# Patient Record
Sex: FEMALE | Race: WHITE | NOT HISPANIC OR LATINO | URBAN - METROPOLITAN AREA
[De-identification: names, ages, dates, MRNs, and addresses within clinical notes are randomized per-mention and may not be internally consistent; named-entity substitution may affect disease eponyms.]

---

## 2023-10-02 ENCOUNTER — OFFICE VISIT (OUTPATIENT)
Dept: OPHTHALMOLOGY | Facility: MEDICAL CENTER | Age: 53
End: 2023-10-02
Payer: OTHER MISCELLANEOUS

## 2023-10-02 DIAGNOSIS — H52.13 MYOPIA OF BOTH EYES: ICD-10-CM

## 2023-10-02 DIAGNOSIS — H47.321 DRUSEN OF RIGHT OPTIC DISC: ICD-10-CM

## 2023-10-02 PROCEDURE — 99204 OFFICE O/P NEW MOD 45 MIN: CPT | Mod: 25 | Performed by: OPHTHALMOLOGY

## 2023-10-02 PROCEDURE — 92250 FUNDUS PHOTOGRAPHY W/I&R: CPT | Performed by: OPHTHALMOLOGY

## 2023-10-02 PROCEDURE — 92083 EXTENDED VISUAL FIELD XM: CPT | Performed by: OPHTHALMOLOGY

## 2023-10-02 RX ORDER — LEVOTHYROXINE SODIUM 0.12 MG/1
TABLET ORAL
COMMUNITY
Start: 2023-07-28

## 2023-10-02 RX ORDER — CLOBETASOL PROPIONATE 0.5 MG/G
OINTMENT TOPICAL
COMMUNITY
Start: 2023-07-28

## 2023-10-02 RX ORDER — POTASSIUM CHLORIDE 750 MG/1
10 TABLET, FILM COATED, EXTENDED RELEASE ORAL
COMMUNITY
Start: 2023-07-28

## 2023-10-02 RX ORDER — LIOTHYRONINE SODIUM 5 UG/1
TABLET ORAL
COMMUNITY

## 2023-10-02 RX ORDER — FUROSEMIDE 20 MG/1
TABLET ORAL
COMMUNITY
Start: 2023-09-07

## 2023-10-02 ASSESSMENT — TONOMETRY
OD_IOP_MMHG: 19
OS_IOP_MMHG: 19
OD_IOP_MMHG: 19
IOP_METHOD: APPLANATION
OS_IOP_MMHG: 18
IOP_METHOD: I-CARE

## 2023-10-02 ASSESSMENT — REFRACTION_WEARINGRX
OS_CYLINDER: +0.50
OS_AXIS: 077
SPECS_TYPE: SVL
OS_SPHERE: -4.25
OD_SPHERE: -3.75
OD_CYLINDER: SPHERE

## 2023-10-02 ASSESSMENT — VISUAL ACUITY
OD_CC: 20/20
OS_CC: 20/20
OD_CC+: -1
CORRECTION_TYPE: CONTACTS
OD_CC: J1+
METHOD: SNELLEN - LINEAR
OS_CC: J1+

## 2023-10-02 ASSESSMENT — REFRACTION_MANIFEST
OD_SPHERE: -3.75
OS_CYLINDER: +0.75
OD_AXIS: 067
OD_CYLINDER: +0.25
OS_SPHERE: -3.75
METHOD_AUTOREFRACTION: 1
OS_AXIS: 096

## 2023-10-02 ASSESSMENT — CONF VISUAL FIELD
OD_INFERIOR_NASAL_RESTRICTION: 0
OD_NORMAL: 1
OD_INFERIOR_TEMPORAL_RESTRICTION: 0
OD_SUPERIOR_NASAL_RESTRICTION: 0
OS_INFERIOR_TEMPORAL_RESTRICTION: 0
OS_INFERIOR_NASAL_RESTRICTION: 0
OS_SUPERIOR_TEMPORAL_RESTRICTION: 0
OD_SUPERIOR_TEMPORAL_RESTRICTION: 0
OS_NORMAL: 1
OS_SUPERIOR_NASAL_RESTRICTION: 0

## 2023-10-02 ASSESSMENT — EXTERNAL EXAM - RIGHT EYE: OD_EXAM: NORMAL

## 2023-10-02 ASSESSMENT — SLIT LAMP EXAM - LIDS
COMMENTS: NORMAL
COMMENTS: NORMAL

## 2023-10-02 ASSESSMENT — EXTERNAL EXAM - LEFT EYE: OS_EXAM: NORMAL

## 2023-10-02 NOTE — PROGRESS NOTES
Peds/Neuro Ophthalmology:   Semaj Rae M.D.    Date & Time note created:    10/2/2023   4:23 PM     Referring MD / APRN:  MARY Freedman, No att. providers found    Patient ID:  Name:             Claire Ralph     YOB: 1970  Age:                 53 y.o.  female   MRN:               [id number]    Chief Complaint/Reason for Visit:     Optic Disc Drusen (New patient evaluation for both eyes)      History of Present Illness:    Claire Ralph is a 53 y.o. 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 Pt states vision is stable with glasses and contacts. Pt has been followed by Dr. Shelley in [place]. Pt has not seen an changes to vision. Does have allergies and it can make eyes water and itchy sometimes. No headaches.         Review of Systems:  Review of Systems   Eyes:         Optic disc Drusen OU   All other systems reviewed and are negative.      Past Medical History:   Past Medical History:   Diagnosis Date    Arthritis     Thyroid disease        Past Surgical History:  Past Surgical History:   Procedure Laterality Date    KNEE ARTHROSCOPY Right 03/01/2021       Current Outpatient Medications:  Current Outpatient Medications   Medication Sig Dispense Refill    Cyanocobalamin (VITAMIN B 12 PO) Vitamin B 12      levonorgestrel (MIRENA) 20 MCG/DAY IUD Intrauterine      clobetasol (TEMOVATE) 0.05 % Ointment 1 CLARA APPLIED TOPICALLY THREE NIGHTS PER WEEK 30 DAYS      furosemide (LASIX) 20 MG Tab TAKE 1 TABLET BY MOUTH EVERY DAY FOR 90 DAYS      levothyroxine (SYNTHROID) 125 MCG Tab TAKE 1 TABLET BY MOUTH EVERY DAY FOR 90 DAYS      liothyronine (CYTOMEL) 5 MCG Tab TAKE 1 TABLETS ORALLY TWICE A DAY 90 DAYS      KLOR-CON 10 10 MEQ tablet Take 10 mEq by mouth every day. with food      meloxicam (MOBIC) 7.5 MG Tab Take 7.5 mg by mouth every day.      meloxicam (MOBIC) 15 MG tablet Take 1 Tablet by mouth every day. 30 Tablet 0     No current facility-administered medications for this  visit.       Allergies:  Allergies   Allergen Reactions    Nitrofurantoin Hives     Gi upset, nausea    Sulfa Drugs Rash     skin    Sulfamethoxazole W-Trimethoprim Rash       Family History:  Family History   Problem Relation Age of Onset    Cancer Mother        Social History:  Social History     Socioeconomic History    Marital status: Single     Spouse name: Not on file    Number of children: Not on file    Years of education: Not on file    Highest education level: Not on file   Occupational History    Not on file   Tobacco Use    Smoking status: Never    Smokeless tobacco: Never   Vaping Use    Vaping Use: Former   Substance and Sexual Activity    Alcohol use: Yes     Comment: socially    Drug use: Never    Sexual activity: Not on file   Other Topics Concern    Not on file   Social History Narrative    Cardiac and pulmonary rehab coordinator.     Social Determinants of Health     Financial Resource Strain: Not on file   Food Insecurity: Not on file   Transportation Needs: Not on file   Physical Activity: Not on file   Stress: Not on file   Social Connections: Not on file   Intimate Partner Violence: Not on file   Housing Stability: Not on file          Physical Exam:  Physical Exam    Oriented x 3  Weight/BMI: There is no height or weight on file to calculate BMI.  There were no vitals taken for this visit.    Base Eye Exam       Visual Acuity (Snellen - Linear)         Right Left    Dist cc 20/20 -1 20/20    Near cc J1+ J1+      Correction: Contacts              Tonometry (i-care, 8:01 AM)         Right Left    Pressure 19 18              Pupils         Pupils    Right PERRL    Left PERRL              Visual Fields         Right Left     Full Full              Extraocular Movement         Right Left     Full, Ortho Full, Ortho              Neuro/Psych       Oriented x3: Yes    Mood/Affect: Normal                  Additional Tests       Color         Right Left    Ishihara 12/12 12/12              Stereo        Fly: +    Animals: 3/3    Circles: 7/9                  Slit Lamp and Fundus Exam       External Exam         Right Left    External Normal Normal              Slit Lamp Exam         Right Left    Lids/Lashes Normal Normal    Conjunctiva/Sclera White and quiet White and quiet    Cornea Clear Clear    Anterior Chamber Deep and quiet Deep and quiet    Iris Round and reactive Round and reactive    Lens Clear Clear    Vitreous Normal Normal              Fundus Exam         Right Left    Disc Tilted disc, Drusen Tilted disc    Macula Normal Normal    Vessels Normal Normal    Periphery Normal Normal                  Refraction       Wearing Rx         Sphere Cylinder Axis    Right -3.75 Sphere     Left -4.25 +0.50 077      Age: 2yr    Type: SVL              Manifest Refraction (Auto)         Sphere Cylinder Axis    Right -3.75 +0.25 067    Left -3.75 +0.75 096                    Pertinent Lab/Test/Imaging Review:      Assessment and Plan:     Myopia of both eyes  10/2/2023-no change in Rx needed    Drusen of right optic disc  10/2/2023-patient referred from Dr. Shelley at [place] for advanced eye care.  Has a history of optic nerve drusen.  On examination today she has very tilted optic nerve heads.  There is a calcified drusen in the right optic nerve superiorly.  Pastor visual marcus demonstrates a decrease in nasal sensitivity of the right eye.  OCT neurofibrillary thickness is 73 OD 87 OS.  According to the patient there was some progressive change in her visual field, however Dr. Shelley did not supply the previous fields from years ago only the more recent one which confirms the findings today.  Therefore would like to obtain older visual fields and OCT's if available.  At this point with the intraocular pressure of 19 OU would continue to monitor.    45 minutes time spent.  This included review of notes from Dr. Shelley, review of findings with patient, emulating note in epic.    Semaj Rae M.D.

## 2023-10-02 NOTE — ASSESSMENT & PLAN NOTE
10/2/2023-patient referred from Dr. Shelley at [place] for advanced eye care.  Has a history of optic nerve drusen.  On examination today she has very tilted optic nerve heads.  There is a calcified drusen in the right optic nerve superiorly.  Pastor visual marcus demonstrates a decrease in nasal sensitivity of the right eye.  OCT neurofibrillary thickness is 73 OD 87 OS.  According to the patient there was some progressive change in her visual field, however Dr. Shelley did not supply the previous fields from years ago only the more recent one which confirms the findings today.  Therefore would like to obtain older visual fields and OCT's if available.  At this point with the intraocular pressure of 19 OU would continue to monitor.

## 2023-10-03 ENCOUNTER — DOCUMENTATION (OUTPATIENT)
Dept: OPHTHALMOLOGY | Facility: MEDICAL CENTER | Age: 53
End: 2023-10-03
Payer: OTHER MISCELLANEOUS

## 2023-10-03 DIAGNOSIS — H47.321 DRUSEN OF RIGHT OPTIC DISC: ICD-10-CM

## 2023-10-04 NOTE — ASSESSMENT & PLAN NOTE
10/2/2023-patient referred from Dr. Shelley at [facility] for advanced eye care.  Has a history of optic nerve drusen.  On examination today she has very tilted optic nerve heads.  There is a calcified drusen in the right optic nerve superiorly.  Pastor visual marcus demonstrates a decrease in nasal sensitivity of the right eye.  OCT neurofibrillary thickness is 73 OD 87 OS.  According to the patient there was some progressive change in her visual field, however Dr. Shelley did not supply the previous fields from years ago only the more recent one which confirms the findings today.  Therefore would like to obtain older visual fields and OCT's if available.  At this point with the intraocular pressure of 19 OU would continue to monitor.  10/3/2023-review of visual fields.  Definitely defect in the right eye nasal 2015.  Visual field defect 2021 essentially similar to today.

## 2023-10-04 NOTE — PROGRESS NOTES
10/2/2023-patient referred from Dr. Shelley at [place] for advanced eye care.  Has a history of optic nerve drusen.  On examination today she has very tilted optic nerve heads.  There is a calcified drusen in the right optic nerve superiorly.  Pastor visual marcus demonstrates a decrease in nasal sensitivity of the right eye.  OCT neurofibrillary thickness is 73 OD 87 OS.  According to the patient there was some progressive change in her visual field, however Dr. Shelley did not supply the previous fields from years ago only the more recent one which confirms the findings today.  Therefore would like to obtain older visual fields and OCT's if available.  At this point with the intraocular pressure of 19 OU would continue to monitor.  10/3/2023-review of visual fields.  Definitely defect in the right eye nasal 2015.  Visual field defect 2021 essentially similar to today.